# Patient Record
Sex: MALE | Race: WHITE | NOT HISPANIC OR LATINO
[De-identification: names, ages, dates, MRNs, and addresses within clinical notes are randomized per-mention and may not be internally consistent; named-entity substitution may affect disease eponyms.]

---

## 2022-12-08 ENCOUNTER — APPOINTMENT (OUTPATIENT)
Dept: ORTHOPEDIC SURGERY | Facility: CLINIC | Age: 42
End: 2022-12-08

## 2022-12-08 VITALS — BODY MASS INDEX: 25.77 KG/M2 | HEIGHT: 70 IN | WEIGHT: 180 LBS

## 2022-12-08 DIAGNOSIS — M22.41 CHONDROMALACIA PATELLAE, RIGHT KNEE: ICD-10-CM

## 2022-12-08 DIAGNOSIS — G56.01 CARPAL TUNNEL SYNDROME, RIGHT UPPER LIMB: ICD-10-CM

## 2022-12-08 PROBLEM — Z00.00 ENCOUNTER FOR PREVENTIVE HEALTH EXAMINATION: Status: ACTIVE | Noted: 2022-12-08

## 2022-12-08 PROCEDURE — 99203 OFFICE O/P NEW LOW 30 MIN: CPT

## 2022-12-08 NOTE — HISTORY OF PRESENT ILLNESS
[8] : 8 [5] : 5 [Dull/Aching] : dull/aching [Tingling] : tingling [Intermittent] : intermittent [Nothing helps with pain getting better] : Nothing helps with pain getting better [de-identified] : 12-8-22- He is for two issues. He is a right hand dominant male with chronic pain and tingling into the 1/2/3 digits. In year's past he has had emg and has been told he has cts. He wears a brace at night with some help. 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 Denies injury or swelling. 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 No evidence\par for meniscal or ligament tears. M22.41\par \par Small knee joint effusion. M25.461 [] : no [FreeTextEntry1] : RT Wrist [FreeTextEntry5] : Pain running down his arm, numbness and tingling. Started 2 years ago and is getting worse over time.

## 2022-12-08 NOTE — PHYSICAL EXAM
[Right] : right knee [NL (0)] : extension 0 degrees [5___] : hamstring 5[unfilled]/5 [Negative] : negative Marily's [] : patient ambulates with assistive device [FreeTextEntry3] : + j sign  [de-identified] : patellar grind pain [TWNoteComboBox7] : flexion 115 degrees

## 2023-01-11 ENCOUNTER — APPOINTMENT (OUTPATIENT)
Dept: ORTHOPEDIC SURGERY | Facility: CLINIC | Age: 43
End: 2023-01-11
Payer: MEDICAID

## 2023-01-11 VITALS — HEIGHT: 70 IN | BODY MASS INDEX: 25.77 KG/M2 | WEIGHT: 180 LBS

## 2023-01-11 DIAGNOSIS — M79.642 PAIN IN RIGHT HAND: ICD-10-CM

## 2023-01-11 DIAGNOSIS — M79.641 PAIN IN RIGHT HAND: ICD-10-CM

## 2023-01-11 DIAGNOSIS — R20.0 ANESTHESIA OF SKIN: ICD-10-CM

## 2023-01-11 PROCEDURE — 73130 X-RAY EXAM OF HAND: CPT | Mod: 50

## 2023-01-11 PROCEDURE — 99214 OFFICE O/P EST MOD 30 MIN: CPT

## 2023-01-11 NOTE — IMAGING
[de-identified] : LEFT HAND\par skin intact. no swelling.\par mild TTP to RF A1 pulley.\par good elbow extension, flexion. good pronation, supination.\par good wrist extension, flexion.\par good EPL, FPL. good finger extension, flex to full fist. good finger abduction and adduction. \par SILT to median, ulnar, radial distributions.\par palpable radial pulse, brisk cap refill all digits.\par  5/5, 1st DI 5/5, APB 5/5.\par no triggering.\par negative Tinel's at carpal tunnel. negative Phalen's test.\par no ulnar nerve subluxation at the elbow. negative Tinel's at cubital tunnel.\par negative Spurling's test.\par \par \par RIGHT HAND\par skin intact. no swelling.\par TTP to dorsal/volar wrist. TTP to IPJ of thumb, DIPJ of IF/MF. TTP to IF/MF A1 pulley.\par good elbow extension, flexion. good pronation, supination.\par good wrist extension, flexion.\par good EPL, FPL. good finger extension, flex to full fist. good finger abduction and adduction. \par SILT to median, ulnar, radial distributions.\par palpable radial pulse, brisk cap refill all digits.\par  5/5, 1st DI 5/5, APB 5/5.\par no triggering.\par negative Tinel's at carpal tunnel. negative Phalen's test.\par no ulnar nerve subluxation at the elbow. negative Tinel's at cubital tunnel.\par negative Spurling's test.\par \par \par XRAYS BILATERAL HANDS: no acute displaced fracture or dislocation.

## 2023-01-11 NOTE — ASSESSMENT
[FreeTextEntry1] : Possible CTS. May also be due to autoimmune/inflammatory disorder.\par Recommend CSI for diagnostic and potential therapeutic benefit. Patient reports that symptoms have been improving since the EDX on 1/5/23 and declined further intervention at this time.\par \par F/u PRN.

## 2023-01-11 NOTE — HISTORY OF PRESENT ILLNESS
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 Symptoms are intermittent, has been feeling better since EDX on 1/5/23. Reports that he developed these symptoms, in addition to diffuse arthralgias and fatigue, after Covid infection in 2020. Has diffuse tenderness throughout hands and unable to play instruments more than 15 minutes when pain flares.\par Saw Rheumatologist => reports labs negative. not on any medication for autoimmune/inflammatory disorder.\par Previously saw Dr. Mccarthy for this on 12/8/22 => ordered EDX.\par Patient reports that he had an EDX done at his PCP's office, and was recommended surgery. However, he reports that he underwent repeat EDX on 1/5/23 and was told that it was normal.\par Wearing carpal tunnel night splint most nights for the last year without improvement.\par \par EDX 1/5/23 - IMPRESSION:\par - L median: DML 3.67ms, DSL 3.34ms.\par - R median: DML 3.75ms, DSL 3.25ms.\par - L ulnar: 47.08m/s below elbow to wrist.\par - R ulnar: 51.17m/s below elbow to wrist.\par \par Hx: IBS. migraines. [] : no [FreeTextEntry1] : Right Hand [FreeTextEntry5] : Pt is a 43 yo M here for right hand pain. Previously has visit with Dr. Mccarthy; had EMG done. Pain is constant and severe. [de-identified] : m [de-identified] : EMG

## 2023-01-13 ENCOUNTER — RX RENEWAL (OUTPATIENT)
Age: 43
End: 2023-01-13

## 2023-01-13 RX ORDER — MELOXICAM 15 MG/1
15 TABLET ORAL
Qty: 30 | Refills: 0 | Status: ACTIVE | COMMUNITY
Start: 2022-12-08 | End: 1900-01-01